# Patient Record
Sex: MALE | Race: WHITE | Employment: OTHER | URBAN - METROPOLITAN AREA
[De-identification: names, ages, dates, MRNs, and addresses within clinical notes are randomized per-mention and may not be internally consistent; named-entity substitution may affect disease eponyms.]

---

## 2020-01-06 ENCOUNTER — HOSPITAL ENCOUNTER (OUTPATIENT)
Dept: GENERAL RADIOLOGY | Age: 85
Discharge: HOME | End: 2020-01-06
Attending: PHYSICIAN ASSISTANT
Payer: MEDICARE

## 2020-01-06 DIAGNOSIS — K62.5 PAINLESS RECTAL BLEEDING: ICD-10-CM

## 2020-01-06 DIAGNOSIS — R13.10 DYSPHAGIA: ICD-10-CM

## 2020-01-06 DIAGNOSIS — Z79.01 ENCOUNTER FOR CURRENT LONG-TERM USE OF ANTICOAGULANTS: ICD-10-CM

## 2020-01-06 PROCEDURE — 74011000255 HC RX REV CODE- 255: Performed by: PHYSICIAN ASSISTANT

## 2020-01-06 PROCEDURE — 74011000250 HC RX REV CODE- 250: Performed by: PHYSICIAN ASSISTANT

## 2020-01-06 PROCEDURE — 74220 X-RAY XM ESOPHAGUS 1CNTRST: CPT

## 2020-01-06 RX ADMIN — BARIUM SULFATE 45 ML: 980 POWDER, FOR SUSPENSION ORAL at 08:10

## 2020-01-06 RX ADMIN — ANTACID/ANTIFLATULENT 4 G: 380; 550; 10; 10 GRANULE, EFFERVESCENT ORAL at 08:10

## 2020-01-20 ENCOUNTER — HOSPITAL ENCOUNTER (OUTPATIENT)
Dept: GENERAL RADIOLOGY | Age: 85
Discharge: HOME | End: 2020-01-20
Attending: PHYSICIAN ASSISTANT
Payer: MEDICARE

## 2020-01-20 DIAGNOSIS — R13.10 DYSPHAGIA, UNSPECIFIED TYPE: ICD-10-CM

## 2020-01-20 DIAGNOSIS — E80.6 HYPERBILIRUBINEMIA: ICD-10-CM

## 2020-01-20 DIAGNOSIS — Z79.01 LONG TERM (CURRENT) USE OF ANTICOAGULANTS: ICD-10-CM

## 2020-01-20 DIAGNOSIS — K62.5 PAINLESS RECTAL BLEEDING: ICD-10-CM

## 2020-01-20 DIAGNOSIS — R13.10 DYSPHAGIA: ICD-10-CM

## 2020-01-20 PROCEDURE — 74230 X-RAY XM SWLNG FUNCJ C+: CPT

## 2020-01-20 PROCEDURE — 92611 MOTION FLUOROSCOPY/SWALLOW: CPT

## 2020-01-20 PROCEDURE — 74011000255 HC RX REV CODE- 255: Performed by: PHYSICIAN ASSISTANT

## 2020-01-20 RX ADMIN — BARIUM SULFATE 700 MG: 700 TABLET ORAL at 13:07

## 2020-01-20 RX ADMIN — BARIUM SULFATE 30 ML: 400 PASTE ORAL at 13:07

## 2020-01-20 RX ADMIN — BARIUM SULFATE 15 ML: 400 SUSPENSION ORAL at 13:07

## 2020-01-20 RX ADMIN — BARIUM SULFATE 45 G: 960 POWDER, FOR SUSPENSION ORAL at 13:08

## 2020-01-20 NOTE — PROGRESS NOTES
[address] [facility]  SPEECH LANGUAGE PATHOLOGY OUTPATIENT MODIFIED BARIUM SWALLOW STUDY    Patient: Celena Dee (76 y.o. male)  Date: 1/20/2020  Primary Diagnosis: Dysphagia [R13.10]  Hyperbilirubinemia [E80.6]  Long term (current) use of anticoagulants [Z79.01]  Painless rectal bleeding [K62.5]  Precautions: Aspiration       ASSESSMENT :  Based on the objective data described below, the patient presents with mild oral and moderately severe pharyngeal dysphagia c/b silent laryngeal penetration during the swallow with thin, nectar thick and honey thick liquids. Pt able to tolerate pudding, regular solids and 13 mm Ba pill with pudding wash with positive airway protection noted across multiple trials. Deficits include delayed/discoordinated bolus manipulation, premature spillage, swallow delay, decreased laryngeal elevation/adduction/sensation and slowed epiglottic inversion. Pt with overall decreased pharyngeal strength/motility resulting in mild pharyngeal residuals across trials that were cleared independently. Safest, least restrictive diet is regular diet with pudding thick liquids with meds whole in applesauce or pudding; Pt would benefit from outpatient SLP to address deficits. Results/recommendations discussed with pt and pt's wife with handout provided regarding thickening agent information and comp strategies/aspiration precautions. Pt's wife verbalized understanding. Pt stated \"my phlegm is better though\" with minimal understanding returned. PLAN :    Recommendations:  Regular diet with pudding thick liquids  Aspiration precautions  HOB >45 during po intake, remain >30 for 30-45 minutes after po   Small bites/sips; alternate liquid/solid with slow feeding rate   Oral care TID  Meds whole with applesauce or pudding   Outpatient SLP      SUBJECTIVE:   Patient stated My phlegm is better though.     OBJECTIVE:     Past Medical History:   Diagnosis Date    Arrhythmia     persistant atrial fibrillation    Arthritis     Asthma     Atrial fibrillation ([facility] [address] 8/11/2010    Bradycardia     CAD (coronary artery disease) 2006    status post MI with emergent bypass surgery.  Cerebral infarct ([facility] [address] 1993    right-sided cerebral infarct - treated with antiplatelet therapy    Diabetes ([facility] [address]     Dyslipidemia 8/11/2010    Dyslipidemia     Hypertension     Ischemic cardiomyopathy 8/11/2010    LV dysfunction     mild to moderate LV systolic dysfunction    Muscle weakness 8/11/2010    Pulmonary disease      Past Surgical History:   Procedure Laterality Date    CARDIAC SURG PROCEDURE UNLIST  2006    CABG with PCI drug-eluting stent to the vein graft of RCA     Current Diet:  Regular diet with thin liquids; recommend change to regular diet with pudding thick liquids    Radiology:  Film Views: Fluoro;Lateral  Patient Position: 90 in chair    Trial 1: Trial 2:   Consistency Presented: Thin liquid;Honey thick liquid; Nectar thick liquid Consistency Presented: Pudding; Solid(13 mm Ba pill with pudding wash)   How Presented: Self-fed/presented;Cup/sip;Spoon; Successive swallows;Straw How Presented: Self-fed/presented;Cup/sip;Spoon   Bolus Acceptance: No impairment Bolus Acceptance: No impairment   Bolus Formation/Control: Impaired: Premature spillage;Delayed; Posterior;Poor;Spillage Bolus Formation/Control: Impaired: Delayed;Premature spillage   Propulsion: Discoordination;Delayed (# of seconds) Propulsion: Delayed (# of seconds); Discoordination   Oral Residue: None     Initiation of Swallow: No impairment     Timing: No impairment Timing: No impairment   Penetration: During swallow; To laryngeal vestibule;From initial swallow Penetration: None   Aspiration/Timing: No evidence of aspiration Aspiration/Timing: No evidence of aspiration   Pharyngeal Clearance: Vallecular residue;Pyriform residue ; Less than 10% Pharyngeal Clearance: No residue   Attempted Modifications: Chin tuck;Effortful swallow;Spoon(Bolus hold/swallow)     Effective Modifications: None     Cues for Modifications: Minimal         Decreased Tongue Base Retraction?: Yes  Laryngeal Elevation: Inadequate epiglottic inversion; Incomplete laryngeal closure  Aspiration/Penetration Score: 3 (Penetration/Visible residue-Contrast remains above the folds/cords, but is not cleared)  Pharyngeal Symmetry: Not assessed  Pharyngeal-Esophageal Segment: Decreased relaxation of upper esophageal segment  Pharyngeal Dysfunction: Crico-pharyngeal dysfunction;Decreased tongue base retraction;Decreased strength;Decreased elevation/closure  Oral Phase Severity: Mild  Pharyngeal Phase Severity: Moderately severe    8-point Penetration-Aspiration Scale: Score 3    PAIN:  Pt reports 0/10 pain or discomfort prior to MBS. Pt reports 0/10 pain or discomfort post MBS. COMMUNICATION/EDUCATION:   [x]  Education provided post diagnostic testing including oropharyngeal anatomy/physiology, MBS results, diet recommendations and        compensatory strategies/positioning. [x]  Video feedback utilized. [x]  Handout regarding diet recommendations and thickener instructions provided. []  Patient/family have participated as able in goal setting and plan of care. [x]  Patient/family agree to work toward stated goals and plan of care. []  Patient understands intent and goals of therapy, but is neutral about his/her participation. []  Patient is unable to participate in goal setting and plan of care.     Thank you for this referral,  Johnna Barthel, M.S., [id number] [facility]  Speech-Language Pathologist

## 2020-02-12 ENCOUNTER — HOSPITAL ENCOUNTER (OUTPATIENT)
Dept: ULTRASOUND IMAGING | Age: 85
Discharge: HOME | End: 2020-02-12
Attending: INTERNAL MEDICINE
Payer: MEDICARE

## 2020-02-12 DIAGNOSIS — E80.6 HYPERBILIRUBINEMIA: ICD-10-CM

## 2020-02-12 DIAGNOSIS — K62.5 PAINLESS RECTAL BLEEDING: ICD-10-CM

## 2020-02-12 DIAGNOSIS — Z79.01 LONG TERM (CURRENT) USE OF ANTICOAGULANTS: ICD-10-CM

## 2020-02-12 DIAGNOSIS — R13.10 DYSPHAGIA: ICD-10-CM

## 2020-02-12 PROCEDURE — 76981 USE PARENCHYMA: CPT

## 2020-11-03 ENCOUNTER — HOSPITAL ENCOUNTER (OUTPATIENT)
Dept: LAB | Age: 85
Discharge: HOME | End: 2020-11-03
Payer: MEDICARE

## 2020-11-03 ENCOUNTER — TRANSCRIBE ORDER (OUTPATIENT)
Dept: REGISTRATION | Age: 85
End: 2020-11-03

## 2020-11-03 DIAGNOSIS — Z01.818 PRE-OP EXAMINATION: ICD-10-CM

## 2020-11-03 DIAGNOSIS — Z01.818 PRE-OP EXAMINATION: Primary | ICD-10-CM

## 2020-11-03 LAB
ANION GAP SERPL CALC-SCNC: 5 MMOL/L (ref 3–18)
BUN SERPL-MCNC: 39 MG/DL (ref 7–18)
BUN/CREAT SERPL: 23 (ref 12–20)
CALCIUM SERPL-MCNC: 8.6 MG/DL (ref 8.5–10.1)
CHLORIDE SERPL-SCNC: 104 MMOL/L (ref 100–111)
CO2 SERPL-SCNC: 33 MMOL/L (ref 21–32)
CREAT SERPL-MCNC: 1.68 MG/DL (ref 0.6–1.3)
GLUCOSE SERPL-MCNC: 146 MG/DL (ref 74–99)
POTASSIUM SERPL-SCNC: 4.2 MMOL/L (ref 3.5–5.5)
SODIUM SERPL-SCNC: 142 MMOL/L (ref 136–145)

## 2020-11-03 PROCEDURE — 36415 COLL VENOUS BLD VENIPUNCTURE: CPT

## 2020-11-03 PROCEDURE — 93005 ELECTROCARDIOGRAM TRACING: CPT

## 2020-11-03 PROCEDURE — 80048 BASIC METABOLIC PNL TOTAL CA: CPT

## 2020-11-05 LAB
ATRIAL RATE: 62 BPM
CALCULATED P AXIS, ECG09: -22 DEGREES
CALCULATED R AXIS, ECG10: -55 DEGREES
CALCULATED T AXIS, ECG11: 94 DEGREES
DIAGNOSIS, 93000: NORMAL
P-R INTERVAL, ECG05: 80 MS
Q-T INTERVAL, ECG07: 496 MS
QRS DURATION, ECG06: 146 MS
QTC CALCULATION (BEZET), ECG08: 503 MS
VENTRICULAR RATE, ECG03: 62 BPM

## 2020-11-16 ENCOUNTER — HOSPITAL ENCOUNTER (OUTPATIENT)
Dept: LAB | Age: 85
Discharge: HOME | End: 2020-11-16
Payer: MEDICARE

## 2020-11-16 PROCEDURE — 88332 PATH CONSLTJ SURG EA ADD BLK: CPT

## 2020-11-16 PROCEDURE — 88331 PATH CONSLTJ SURG 1 BLK 1SPC: CPT

## 2020-11-16 PROCEDURE — 88305 TISSUE EXAM BY PATHOLOGIST: CPT
